# Patient Record
Sex: FEMALE | Race: WHITE | NOT HISPANIC OR LATINO | Employment: FULL TIME | URBAN - METROPOLITAN AREA
[De-identification: names, ages, dates, MRNs, and addresses within clinical notes are randomized per-mention and may not be internally consistent; named-entity substitution may affect disease eponyms.]

---

## 2021-11-03 PROBLEM — R53.83 FATIGUE: Status: ACTIVE | Noted: 2021-11-03

## 2021-11-03 PROBLEM — R10.32 LEFT LOWER QUADRANT PAIN: Status: ACTIVE | Noted: 2021-11-03

## 2023-01-10 LAB — ANTE RHIG: NORMAL

## 2024-01-23 PROBLEM — O36.8390 FETAL ARRHYTHMIA AFFECTING PREGNANCY, ANTEPARTUM: Status: ACTIVE | Noted: 2024-01-23

## 2024-04-08 ENCOUNTER — HOSPITAL ENCOUNTER (OUTPATIENT)
Dept: CARDIOLOGY | Facility: HOSPITAL | Age: 24
Discharge: HOME | End: 2024-04-08
Attending: INTERNAL MEDICINE
Payer: COMMERCIAL

## 2024-04-08 ENCOUNTER — OFFICE VISIT (OUTPATIENT)
Dept: CARDIOLOGY | Facility: CLINIC | Age: 24
End: 2024-04-08
Payer: COMMERCIAL

## 2024-04-08 VITALS
SYSTOLIC BLOOD PRESSURE: 106 MMHG | BODY MASS INDEX: 27.31 KG/M2 | HEART RATE: 88 BPM | DIASTOLIC BLOOD PRESSURE: 64 MMHG | WEIGHT: 160 LBS | OXYGEN SATURATION: 98 % | HEIGHT: 64 IN

## 2024-04-08 DIAGNOSIS — Z76.89 ENCOUNTER TO ESTABLISH CARE WITH NEW DOCTOR: ICD-10-CM

## 2024-04-08 DIAGNOSIS — R55 SYNCOPE AND COLLAPSE: Primary | ICD-10-CM

## 2024-04-08 DIAGNOSIS — Z76.89 ENCOUNTER TO ESTABLISH CARE WITH NEW DOCTOR: Primary | ICD-10-CM

## 2024-04-08 DIAGNOSIS — Z3A.18 18 WEEKS GESTATION OF PREGNANCY: ICD-10-CM

## 2024-04-08 DIAGNOSIS — R55 SYNCOPE AND COLLAPSE: ICD-10-CM

## 2024-04-08 LAB
OHS QRS DURATION: 70 MS
OHS QTC CALCULATION: 426 MS

## 2024-04-08 PROCEDURE — 3008F BODY MASS INDEX DOCD: CPT | Mod: CPTII,S$GLB,, | Performed by: INTERNAL MEDICINE

## 2024-04-08 PROCEDURE — 3074F SYST BP LT 130 MM HG: CPT | Mod: CPTII,S$GLB,, | Performed by: INTERNAL MEDICINE

## 2024-04-08 PROCEDURE — 99204 OFFICE O/P NEW MOD 45 MIN: CPT | Mod: S$GLB,,, | Performed by: INTERNAL MEDICINE

## 2024-04-08 PROCEDURE — 99999 PR PBB SHADOW E&M-EST. PATIENT-LVL III: CPT | Mod: PBBFAC,,, | Performed by: INTERNAL MEDICINE

## 2024-04-08 PROCEDURE — 3078F DIAST BP <80 MM HG: CPT | Mod: CPTII,S$GLB,, | Performed by: INTERNAL MEDICINE

## 2024-04-08 PROCEDURE — 1160F RVW MEDS BY RX/DR IN RCRD: CPT | Mod: CPTII,S$GLB,, | Performed by: INTERNAL MEDICINE

## 2024-04-08 PROCEDURE — 93010 ELECTROCARDIOGRAM REPORT: CPT | Mod: ,,, | Performed by: INTERNAL MEDICINE

## 2024-04-08 PROCEDURE — 93005 ELECTROCARDIOGRAM TRACING: CPT

## 2024-04-08 PROCEDURE — 1159F MED LIST DOCD IN RCRD: CPT | Mod: CPTII,S$GLB,, | Performed by: INTERNAL MEDICINE

## 2024-04-08 NOTE — PROGRESS NOTES
Subjective:   Patient ID:  Maddy Fernandes is a 23 y.o. female who presents for evaluation of No chief complaint on file.      22 yo referred for syncope   28 weeks pregnancy   EKG reviewed by myself today reveals NSR  After lunch. Sitting on the table. Felt leg and hand numbness, palpitation SOB,flush, black spots. Then black out. woke up in minutes. Confused. No incontinece.  Sent to . The lab and EKG unremarkable. BP ok  Recurrent episodes of fainting  No drugs.   No f/h premature CAD             No results found for this or any previous visit.     No results found for this or any previous visit.       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    OVARIAN CYST REMOVAL  01/2014       Social History     Tobacco Use    Smoking status: Former     Types: Vaping with nicotine    Smokeless tobacco: Former   Substance Use Topics    Alcohol use: Yes    Drug use: Not Currently       Family History   Family history unknown: Yes       Review of Systems   Cardiovascular:  Positive for syncope.       Objective:   Physical Exam  HENT:      Head: Normocephalic.   Eyes:      Pupils: Pupils are equal, round, and reactive to light.   Neck:      Thyroid: No thyromegaly.      Vascular: Normal carotid pulses. No carotid bruit or JVD.   Cardiovascular:      Rate and Rhythm: Normal rate  "and regular rhythm. No extrasystoles are present.     Chest Wall: PMI is not displaced.      Pulses: Normal pulses.      Heart sounds: Normal heart sounds. No murmur heard.     No gallop. No S3 sounds.   Pulmonary:      Effort: No respiratory distress.      Breath sounds: Normal breath sounds. No stridor.   Abdominal:      General: Bowel sounds are normal.      Palpations: Abdomen is soft.      Tenderness: There is no abdominal tenderness. There is no rebound.   Skin:     Findings: No rash.   Neurological:      Mental Status: She is alert and oriented to person, place, and time.   Psychiatric:         Behavior: Behavior normal.         No results found for: "CHOL"  No results found for: "HDL"  No results found for: "LDLCALC"  No results found for: "TRIG"  No results found for: "CHOLHDL"    Chemistry        Component Value Date/Time    BUN 6 11/14/2023 1529    CREATININE 0.6 11/14/2023 1529        Component Value Date/Time    AST 15 11/14/2023 1529    ALT 8 (L) 11/14/2023 1529          Lab Results   Component Value Date    HGBA1C 5.24 11/09/2021     Lab Results   Component Value Date    TSH 1.63 10/19/2022     No results found for: "INR", "PROTIME"  Lab Results   Component Value Date    WBC 10.38 11/14/2023    HGB 12.7 11/14/2023    HCT 38.5 11/14/2023    MCV 95 11/14/2023     11/14/2023     BNP  @LABRCNTIP(BNP,BNPTRIAGEBLO)@  CrCl cannot be calculated (Patient's most recent lab result is older than the maximum 7 days allowed.).  No results found in the last 24 hours.  No results found in the last 24 hours.  No results found in the last 24 hours.    Assessment:      1. Syncope and collapse    2. 18 weeks gestation of pregnancy      Vasa vagal reaction with prodrome    Plan:   Syncope and collapse  -     Echo; Future  -     Cardiac Monitor - 3-15 Day Adult (Cupid Only); Future    18 weeks gestation of pregnancy        Encourage hydration and compression sock   Echo and BARDY phone review          "

## 2024-04-10 PROBLEM — Z3A.18 18 WEEKS GESTATION OF PREGNANCY: Status: ACTIVE | Noted: 2024-04-10

## 2024-04-22 ENCOUNTER — HOSPITAL ENCOUNTER (OUTPATIENT)
Dept: CARDIOLOGY | Facility: HOSPITAL | Age: 24
Discharge: HOME | End: 2024-04-22
Attending: INTERNAL MEDICINE
Payer: COMMERCIAL

## 2024-04-22 VITALS
SYSTOLIC BLOOD PRESSURE: 102 MMHG | HEIGHT: 64 IN | DIASTOLIC BLOOD PRESSURE: 60 MMHG | BODY MASS INDEX: 28 KG/M2 | WEIGHT: 164 LBS

## 2024-04-22 DIAGNOSIS — R55 SYNCOPE AND COLLAPSE: ICD-10-CM

## 2024-04-22 LAB
AORTIC ROOT ANNULUS: 2.94 CM
ASCENDING AORTA: 2.64 CM
AV INDEX (PROSTH): 0.78
AV MEAN GRADIENT: 4 MMHG
AV PEAK GRADIENT: 8 MMHG
AV VALVE AREA BY VELOCITY RATIO: 2.81 CM²
AV VALVE AREA: 2.85 CM²
AV VELOCITY RATIO: 0.78
BSA FOR ECHO PROCEDURE: 1.83 M2
CV ECHO LV RWT: 0.24 CM
DOP CALC AO PEAK VEL: 1.38 M/S
DOP CALC AO VTI: 27.9 CM
DOP CALC LVOT AREA: 3.6 CM2
DOP CALC LVOT DIAMETER: 2.15 CM
DOP CALC LVOT PEAK VEL: 1.07 M/S
DOP CALC LVOT STROKE VOLUME: 79.47 CM3
DOP CALC RVOT PEAK VEL: 0.8 M/S
DOP CALC RVOT VTI: 17.4 CM
DOP CALCLVOT PEAK VEL VTI: 21.9 CM
E WAVE DECELERATION TIME: 318.16 MSEC
E/A RATIO: 1.56
E/E' RATIO: 4 M/S
ECHO LV POSTERIOR WALL: 0.61 CM (ref 0.6–1.1)
FRACTIONAL SHORTENING: 38 % (ref 28–44)
INTERVENTRICULAR SEPTUM: 0.6 CM (ref 0.6–1.1)
IVC DIAMETER: 1.02 CM
IVRT: 91.34 MSEC
LA MAJOR: 4.58 CM
LA MINOR: 4.23 CM
LA WIDTH: 3 CM
LEFT ATRIUM SIZE: 3.08 CM
LEFT ATRIUM VOLUME INDEX MOD: 22.9 ML/M2
LEFT ATRIUM VOLUME INDEX: 19.2 ML/M2
LEFT ATRIUM VOLUME MOD: 41.14 CM3
LEFT ATRIUM VOLUME: 34.54 CM3
LEFT INTERNAL DIMENSION IN SYSTOLE: 3.14 CM (ref 2.1–4)
LEFT VENTRICLE DIASTOLIC VOLUME INDEX: 67.48 ML/M2
LEFT VENTRICLE DIASTOLIC VOLUME: 121.46 ML
LEFT VENTRICLE MASS INDEX: 54 G/M2
LEFT VENTRICLE SYSTOLIC VOLUME INDEX: 21.8 ML/M2
LEFT VENTRICLE SYSTOLIC VOLUME: 39.24 ML
LEFT VENTRICULAR INTERNAL DIMENSION IN DIASTOLE: 5.06 CM (ref 3.5–6)
LEFT VENTRICULAR MASS: 97.89 G
LV LATERAL E/E' RATIO: 3.36 M/S
LV SEPTAL E/E' RATIO: 4.94 M/S
LVOT MG: 2.47 MMHG
LVOT MV: 0.73 CM/S
MV PEAK A VEL: 0.54 M/S
MV PEAK E VEL: 0.84 M/S
MV STENOSIS PRESSURE HALF TIME: 92.27 MS
MV VALVE AREA P 1/2 METHOD: 2.38 CM2
OHS CV RV/LV RATIO: 0.57 CM
PISA TR MAX VEL: 2.2 M/S
PULM VEIN S/D RATIO: 0.89
PV MEAN GRADIENT: 1 MMHG
PV MV: 0.73 M/S
PV PEAK D VEL: 0.65 M/S
PV PEAK GRADIENT: 5 MMHG
PV PEAK S VEL: 0.58 M/S
PV PEAK VELOCITY: 1.08 M/S
RA MAJOR: 5.31 CM
RA PRESSURE ESTIMATED: 3 MMHG
RA WIDTH: 3.45 CM
RIGHT VENTRICULAR END-DIASTOLIC DIMENSION: 2.89 CM
RV TB RVSP: 5 MMHG
SINUS: 2.61 CM
STJ: 2.73 CM
TDI LATERAL: 0.25 M/S
TDI SEPTAL: 0.17 M/S
TDI: 0.21 M/S
TR MAX PG: 19 MMHG
TRICUSPID ANNULAR PLANE SYSTOLIC EXCURSION: 2.43 CM
TV REST PULMONARY ARTERY PRESSURE: 22 MMHG
Z-SCORE OF LEFT VENTRICULAR DIMENSION IN END DIASTOLE: 0.16
Z-SCORE OF LEFT VENTRICULAR DIMENSION IN END SYSTOLE: 0.16

## 2024-04-22 PROCEDURE — 93248 EXT ECG>7D<15D REV&INTERPJ: CPT | Mod: ,,, | Performed by: INTERNAL MEDICINE

## 2024-04-22 PROCEDURE — 93306 TTE W/DOPPLER COMPLETE: CPT

## 2024-04-22 PROCEDURE — 93306 TTE W/DOPPLER COMPLETE: CPT | Mod: 26,,, | Performed by: INTERNAL MEDICINE

## 2024-04-23 ENCOUNTER — TELEPHONE (OUTPATIENT)
Dept: CARDIOLOGY | Facility: CLINIC | Age: 24
End: 2024-04-23
Payer: COMMERCIAL

## 2024-04-23 NOTE — TELEPHONE ENCOUNTER
Spoke with pt in regards test results. Pt verbalized understanding information.                                     ----- Message from Trung Pierce sent at 4/23/2024  2:13 PM CDT -----  Contact: self  Type:  Patient Returning Call    Who Called:Maddy Fernandes  Who Left Message for Patient:Jairon Barber MA  Does the patient know what this is regarding?:EKG results  Would the patient rather a call back or a response via [facility]? Call back  Best Call Back Number:[phone]  Additional Information: n/a

## 2024-04-23 NOTE — TELEPHONE ENCOUNTER
Patient contacted and  LVM to  advise her that we have results.  Echo showed normal function . Will await the call back.

## 2024-05-09 LAB
OHS CV HOLTER SINUS AVERAGE HR: 94
OHS CV HOLTER SINUS MAX HR: 149
OHS CV HOLTER SINUS MIN HR: 67

## 2024-05-10 ENCOUNTER — TELEPHONE (OUTPATIENT)
Dept: CARDIOLOGY | Facility: CLINIC | Age: 24
End: 2024-05-10
Payer: COMMERCIAL

## 2024-05-10 NOTE — TELEPHONE ENCOUNTER
Attempted to reach the patient, lv           2 weeks monitor showed rare arrhythmia. Normal study

## 2024-05-30 ENCOUNTER — PATIENT MESSAGE (OUTPATIENT)
Dept: CARDIOLOGY | Facility: CLINIC | Age: 24
End: 2024-05-30
Payer: COMMERCIAL

## 2024-06-07 ENCOUNTER — OFFICE VISIT (OUTPATIENT)
Dept: CARDIOLOGY | Facility: CLINIC | Age: 24
End: 2024-06-07
Payer: COMMERCIAL

## 2024-06-07 VITALS
SYSTOLIC BLOOD PRESSURE: 98 MMHG | WEIGHT: 162.06 LBS | OXYGEN SATURATION: 98 % | HEIGHT: 64 IN | DIASTOLIC BLOOD PRESSURE: 60 MMHG | HEART RATE: 85 BPM | BODY MASS INDEX: 27.67 KG/M2

## 2024-06-07 DIAGNOSIS — O26.813 PREGNANCY RELATED FATIGUE IN THIRD TRIMESTER: Primary | ICD-10-CM

## 2024-06-07 DIAGNOSIS — O26.53 MATERNAL HYPOTENSION SYNDROME IN THIRD TRIMESTER: ICD-10-CM

## 2024-06-07 PROBLEM — I95.9 HYPOTENSION: Status: ACTIVE | Noted: 2024-06-07

## 2024-06-07 PROCEDURE — 3078F DIAST BP <80 MM HG: CPT | Mod: CPTII,S$GLB,, | Performed by: INTERNAL MEDICINE

## 2024-06-07 PROCEDURE — 1160F RVW MEDS BY RX/DR IN RCRD: CPT | Mod: CPTII,S$GLB,, | Performed by: INTERNAL MEDICINE

## 2024-06-07 PROCEDURE — 3008F BODY MASS INDEX DOCD: CPT | Mod: CPTII,S$GLB,, | Performed by: INTERNAL MEDICINE

## 2024-06-07 PROCEDURE — 99999 PR PBB SHADOW E&M-EST. PATIENT-LVL III: CPT | Mod: PBBFAC,,, | Performed by: INTERNAL MEDICINE

## 2024-06-07 PROCEDURE — 1159F MED LIST DOCD IN RCRD: CPT | Mod: CPTII,S$GLB,, | Performed by: INTERNAL MEDICINE

## 2024-06-07 PROCEDURE — 99214 OFFICE O/P EST MOD 30 MIN: CPT | Mod: S$GLB,,, | Performed by: INTERNAL MEDICINE

## 2024-06-07 PROCEDURE — 3074F SYST BP LT 130 MM HG: CPT | Mod: CPTII,S$GLB,, | Performed by: INTERNAL MEDICINE

## 2024-06-07 NOTE — PROGRESS NOTES
Subjective:   Patient ID:  Maddy Fernandes is a 23 y.o. female who presents for follow up of Follow-up      22 yo referred for low BP faint  28 weeks pregnancy   EKG reviewed by myself today reveals NSR  After lunch. Sitting on the table. Felt leg and hand numbness, palpitation SOB,flush, black spots. Then black out. woke up in minutes. Confused. No incontinece.  Sent to . The lab and EKG unremarkable. BP ok  Recurrent episodes of fainting  No drugs.   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    OVARIAN CYST REMOVAL  01/2014       Social History     Tobacco Use    Smoking status: Former     Types: Vaping with nicotine    Smokeless tobacco: Former   Substance Use Topics    Alcohol use: Yes    Drug use: Not Currently       Family History   Family history unknown: Yes         ROS    Objective:   Physical Exam  HENT:      Head: Normocephalic.   Eyes:      Pupils: Pupils are equal, round, and reactive to light.   Neck:      Thyroid: No thyromegaly.      Vascular: Normal carotid pulses. No carotid bruit or JVD.   Cardiovascular:      Rate and Rhythm: Normal rate and regular rhythm. No extrasystoles are  "present.     Chest Wall: PMI is not displaced.      Pulses: Normal pulses.      Heart sounds: Normal heart sounds. No murmur heard.     No gallop. No S3 sounds.   Pulmonary:      Effort: No respiratory distress.      Breath sounds: Normal breath sounds. No stridor.   Abdominal:      General: Bowel sounds are normal.      Palpations: Abdomen is soft.      Tenderness: There is no abdominal tenderness. There is no rebound.   Musculoskeletal:         General: Normal range of motion.   Skin:     Findings: No rash.   Neurological:      Mental Status: She is alert and oriented to person, place, and time.   Psychiatric:         Behavior: Behavior normal.         No results found for: "CHOL"  No results found for: "HDL"  No results found for: "LDLCALC"  No results found for: "TRIG"  No results found for: "CHOLHDL"    Chemistry        Component Value Date/Time    BUN 6 11/14/2023 1529    CREATININE 0.6 11/14/2023 1529        Component Value Date/Time    AST 15 11/14/2023 1529    ALT 8 (L) 11/14/2023 1529          Lab Results   Component Value Date    HGBA1C 5.24 11/09/2021     Lab Results   Component Value Date    TSH 0.77 04/10/2024     No results found for: "INR", "PROTIME"  Lab Results   Component Value Date    WBC 9.18 04/10/2024    HGB 10.8 (L) 04/10/2024    HCT 32.3 (L) 04/10/2024    MCV 96.1 04/10/2024     04/10/2024     BMP  BUN   Date Value Ref Range Status   11/14/2023 6 6 - 20 mg/dL Final     Creatinine   Date Value Ref Range Status   11/14/2023 0.6 0.5 - 1.4 mg/dL Final     BNP  @LABRCNTIP(BNP,BNPTRIAGEBLO)@  @LABRCNTIP(troponini)@  CrCl cannot be calculated (Patient's most recent lab result is older than the maximum 7 days allowed.).  No results found in the last 24 hours.  No results found in the last 24 hours.  No results found in the last 24 hours.    Assessment:      1. Pregnancy related fatigue in third trimester    2. Maternal hypotension syndrome in third trimester        Plan:   Pregnancy related " hypotension  Taking compression socks  Hydration    Advise to add Midodrine 5 mg bid with breakfast and lunch and titrate up as indicated if Ok with her OB.  Otherwise, induction is another option